# Patient Record
Sex: MALE | Race: WHITE
[De-identification: names, ages, dates, MRNs, and addresses within clinical notes are randomized per-mention and may not be internally consistent; named-entity substitution may affect disease eponyms.]

---

## 2018-02-16 ENCOUNTER — HOSPITAL ENCOUNTER (EMERGENCY)
Dept: HOSPITAL 12 - ER | Age: 78
Discharge: HOME | End: 2018-02-16
Payer: COMMERCIAL

## 2018-02-16 VITALS — DIASTOLIC BLOOD PRESSURE: 87 MMHG | SYSTOLIC BLOOD PRESSURE: 146 MMHG

## 2018-02-16 VITALS — HEIGHT: 70 IN | BODY MASS INDEX: 27.77 KG/M2 | WEIGHT: 194 LBS

## 2018-02-16 DIAGNOSIS — S02.2XXA: Primary | ICD-10-CM
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
## 2018-02-16 DIAGNOSIS — S01.21XA: ICD-10-CM
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
## 2018-02-16 LAB
ALP SERPL-CCNC: 42 U/L (ref 50–136)
ALT SERPL W/O P-5'-P-CCNC: 25 U/L (ref 16–63)
AST SERPL-CCNC: 22 U/L (ref 15–37)
BASOPHILS # BLD AUTO: 0.1 K/UL (ref 0–8)
BASOPHILS NFR BLD AUTO: 0.7 % (ref 0–2)
BILIRUB SERPL-MCNC: 0.3 MG/DL (ref 0.2–1)
BUN SERPL-MCNC: 27 MG/DL (ref 7–18)
CHLORIDE SERPL-SCNC: 101 MMOL/L (ref 98–107)
CO2 SERPL-SCNC: 29 MMOL/L (ref 21–32)
CREAT SERPL-MCNC: 1.3 MG/DL (ref 0.6–1.3)
EOSINOPHIL # BLD AUTO: 0.2 K/UL (ref 0–0.7)
EOSINOPHIL NFR BLD AUTO: 2.2 % (ref 0–7)
GLUCOSE SERPL-MCNC: 109 MG/DL (ref 74–106)
HCT VFR BLD AUTO: 41.4 % (ref 36.7–47.1)
HGB BLD-MCNC: 14.5 G/DL (ref 12.5–16.3)
LYMPHOCYTES # BLD AUTO: 2 K/UL (ref 20–40)
LYMPHOCYTES NFR BLD AUTO: 19.5 % (ref 20.5–51.5)
MCH RBC QN AUTO: 31.7 UUG (ref 23.8–33.4)
MCHC RBC AUTO-ENTMCNC: 35 G/DL (ref 32.5–36.3)
MCV RBC AUTO: 90.7 FL (ref 73–96.2)
MONOCYTES # BLD AUTO: 0.8 K/UL (ref 2–10)
MONOCYTES NFR BLD AUTO: 8.2 % (ref 0–11)
NEUTROPHILS # BLD AUTO: 7.2 K/UL (ref 1.8–8.9)
NEUTROPHILS NFR BLD AUTO: 69.4 % (ref 38.5–71.5)
PLATELET # BLD AUTO: 237 K/UL (ref 152–348)
POTASSIUM SERPL-SCNC: 3.9 MMOL/L (ref 3.5–5.1)
RBC # BLD AUTO: 4.56 MIL/UL (ref 4.06–5.63)
WBC # BLD AUTO: 10.3 K/UL (ref 3.6–10.2)
WS STN SPEC: 9.1 G/DL (ref 6.4–8.2)

## 2018-02-16 PROCEDURE — A4663 DIALYSIS BLOOD PRESSURE CUFF: HCPCS

## 2018-02-16 NOTE — NUR
PT SITTING AT EDGE OF BED IN POSITION OF COMFORT. FAMILY AT BEDSIDE. NO SIGNS 
OF DISTRESS NOTED. NO ACTIVE BLEEDING NOTED AT FACIAL WOUND.

## 2018-02-16 NOTE — NUR
WOUND CARE COMPLETED PER MD ORDER. LACERATION NOTED ON BRIDGE OF NOSE, BUT NO 
ACTIVE BLEEDING NOTED. NO DISTRESS NOTED. MD NOTIFIED.

## 2018-02-16 NOTE — NUR
Patient discharged to home in stable conditon.  Written and verbal after care 
instructions given. 

Patient verbalizes understanding of instructions. Pt ambulated from ER w/ 
steady gait. Accomplanied by family. Pt took all personal belongings